# Patient Record
Sex: FEMALE | Race: WHITE
[De-identification: names, ages, dates, MRNs, and addresses within clinical notes are randomized per-mention and may not be internally consistent; named-entity substitution may affect disease eponyms.]

---

## 2017-12-13 ENCOUNTER — HOSPITAL ENCOUNTER (EMERGENCY)
Dept: HOSPITAL 92 - SCSER | Age: 22
Discharge: HOME | End: 2017-12-13
Payer: SELF-PAY

## 2017-12-13 DIAGNOSIS — H10.9: Primary | ICD-10-CM

## 2017-12-13 DIAGNOSIS — F41.9: ICD-10-CM

## 2017-12-13 DIAGNOSIS — F84.0: ICD-10-CM

## 2017-12-13 PROCEDURE — 99283 EMERGENCY DEPT VISIT LOW MDM: CPT

## 2019-09-07 ENCOUNTER — HOSPITAL ENCOUNTER (EMERGENCY)
Dept: HOSPITAL 92 - ERS | Age: 24
Discharge: HOME | End: 2019-09-07
Payer: SELF-PAY

## 2019-09-07 DIAGNOSIS — F41.9: ICD-10-CM

## 2019-09-07 DIAGNOSIS — R10.31: ICD-10-CM

## 2019-09-07 DIAGNOSIS — M25.561: ICD-10-CM

## 2019-09-07 DIAGNOSIS — F32.9: ICD-10-CM

## 2019-09-07 DIAGNOSIS — S70.11XA: Primary | ICD-10-CM

## 2019-09-07 DIAGNOSIS — F43.10: ICD-10-CM
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
## 2019-09-07 LAB
PREGS CONTROL BACKGROUND?: (no result)
PREGS CONTROL BAR APPEAR?: YES
RBC UR QL AUTO: (no result) HPF (ref 0–3)
WBC UR QL AUTO: (no result) HPF (ref 0–3)

## 2019-09-07 PROCEDURE — 81015 MICROSCOPIC EXAM OF URINE: CPT

## 2019-09-07 PROCEDURE — 96372 THER/PROPH/DIAG INJ SC/IM: CPT

## 2019-09-07 PROCEDURE — 36415 COLL VENOUS BLD VENIPUNCTURE: CPT

## 2019-09-07 PROCEDURE — 81003 URINALYSIS AUTO W/O SCOPE: CPT

## 2019-09-07 PROCEDURE — 84703 CHORIONIC GONADOTROPIN ASSAY: CPT

## 2019-09-07 PROCEDURE — 74177 CT ABD & PELVIS W/CONTRAST: CPT
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
## 2019-09-07 NOTE — RAD
EXAM:

XR Knee Rt 4 View STANDARD



PROVIDED CLINICAL HISTORY:

Pain



FINDINGS:

There is no evidence for fracture or other acute osseous abnormality. Alignment appears anatomic. Narcisa
nt spaces appear preserved.



IMPRESSION:

No evidence for an acute osseous abnormality. If there is persistent clinical concern, conservative m
anagement and follow-up imaging advised.



Reported By: Pierre Drake 

Electronically Signed:  9/7/2019 7:49 AM

## 2019-09-07 NOTE — RAD
EXAM:

XR Femur Rt 2 View STANDARD



PROVIDED CLINICAL HISTORY:

Pain



FINDINGS:

There is no evidence for fracture or other acute osseous abnormality. Alignment appears anatomic. Narcisa
nt spaces appear preserved.



IMPRESSION:

No evidence for an acute osseous abnormality. If there is persistent clinical concern, conservative m
anagement and follow-up imaging advised.



Reported By: Pierre Drake 

Electronically Signed:  9/7/2019 7:49 AM